# Patient Record
Sex: FEMALE | Race: BLACK OR AFRICAN AMERICAN | NOT HISPANIC OR LATINO | Employment: STUDENT | RURAL
[De-identification: names, ages, dates, MRNs, and addresses within clinical notes are randomized per-mention and may not be internally consistent; named-entity substitution may affect disease eponyms.]

---

## 2021-07-25 ENCOUNTER — HOSPITAL ENCOUNTER (EMERGENCY)
Facility: HOSPITAL | Age: 21
Discharge: HOME | End: 2021-07-25
Attending: EMERGENCY MEDICINE
Payer: MEDICAID

## 2021-07-25 VITALS
OXYGEN SATURATION: 98 % | DIASTOLIC BLOOD PRESSURE: 82 MMHG | SYSTOLIC BLOOD PRESSURE: 120 MMHG | RESPIRATION RATE: 18 BRPM | WEIGHT: 156 LBS | HEIGHT: 63 IN | HEART RATE: 98 BPM | TEMPERATURE: 99 F | BODY MASS INDEX: 27.64 KG/M2

## 2021-07-25 DIAGNOSIS — H66.001 NON-RECURRENT ACUTE SUPPURATIVE OTITIS MEDIA OF RIGHT EAR WITHOUT SPONTANEOUS RUPTURE OF TYMPANIC MEMBRANE: ICD-10-CM

## 2021-07-25 DIAGNOSIS — H60.391 OTHER INFECTIVE ACUTE OTITIS EXTERNA OF RIGHT EAR: Primary | ICD-10-CM

## 2021-07-25 PROCEDURE — 99283 EMERGENCY DEPT VISIT LOW MDM: CPT | Mod: ,,, | Performed by: EMERGENCY MEDICINE

## 2021-07-25 PROCEDURE — 99283 PR EMERGENCY DEPT VISIT,LEVEL III: ICD-10-PCS | Mod: ,,, | Performed by: EMERGENCY MEDICINE

## 2021-07-25 PROCEDURE — 96372 THER/PROPH/DIAG INJ SC/IM: CPT

## 2021-07-25 PROCEDURE — 99284 EMERGENCY DEPT VISIT MOD MDM: CPT

## 2021-07-25 PROCEDURE — 63600175 PHARM REV CODE 636 W HCPCS: Performed by: EMERGENCY MEDICINE

## 2021-07-25 RX ORDER — CEFTRIAXONE 1 G/1
1 INJECTION, POWDER, FOR SOLUTION INTRAMUSCULAR; INTRAVENOUS
Status: COMPLETED | OUTPATIENT
Start: 2021-07-25 | End: 2021-07-25

## 2021-07-25 RX ORDER — INSULIN GLARGINE 100 [IU]/ML
30 INJECTION, SOLUTION SUBCUTANEOUS NIGHTLY
COMMUNITY
Start: 2021-06-23

## 2021-07-25 RX ORDER — CLINDAMYCIN HYDROCHLORIDE 300 MG/1
300 CAPSULE ORAL 4 TIMES DAILY
Qty: 28 CAPSULE | Refills: 0 | Status: SHIPPED | OUTPATIENT
Start: 2021-07-25 | End: 2021-08-01

## 2021-07-25 RX ORDER — INSULIN ASPART 100 [IU]/ML
1-12 INJECTION, SOLUTION INTRAVENOUS; SUBCUTANEOUS
COMMUNITY
Start: 2021-06-23

## 2021-07-25 RX ADMIN — CEFTRIAXONE SODIUM 1 G: 1 INJECTION, POWDER, FOR SOLUTION INTRAMUSCULAR; INTRAVENOUS at 01:07

## 2021-07-26 ENCOUNTER — TELEPHONE (OUTPATIENT)
Dept: EMERGENCY MEDICINE | Facility: HOSPITAL | Age: 21
End: 2021-07-26

## 2022-09-08 ENCOUNTER — HOSPITAL ENCOUNTER (EMERGENCY)
Facility: HOSPITAL | Age: 22
Discharge: HOME | End: 2022-09-08
Attending: INTERNAL MEDICINE
Payer: MEDICAID

## 2022-09-08 VITALS
HEIGHT: 64 IN | WEIGHT: 148.63 LBS | SYSTOLIC BLOOD PRESSURE: 134 MMHG | RESPIRATION RATE: 19 BRPM | TEMPERATURE: 98 F | DIASTOLIC BLOOD PRESSURE: 82 MMHG | OXYGEN SATURATION: 99 % | HEART RATE: 113 BPM | BODY MASS INDEX: 25.38 KG/M2
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
## 2022-09-08 DIAGNOSIS — L02.91 ABSCESS: Primary | ICD-10-CM

## 2022-09-08 LAB — GLUCOSE SERPL-MCNC: 159 MG/DL (ref 70–105)

## 2022-09-08 PROCEDURE — 99284 EMERGENCY DEPT VISIT MOD MDM: CPT

## 2022-09-08 PROCEDURE — 63600175 PHARM REV CODE 636 W HCPCS: Performed by: INTERNAL MEDICINE

## 2022-09-08 PROCEDURE — 82962 GLUCOSE BLOOD TEST: CPT

## 2022-09-08 PROCEDURE — 99284 EMERGENCY DEPT VISIT MOD MDM: CPT | Mod: ,,, | Performed by: INTERNAL MEDICINE

## 2022-09-08 PROCEDURE — 96372 THER/PROPH/DIAG INJ SC/IM: CPT

## 2022-09-08 PROCEDURE — 99284 PR EMERGENCY DEPT VISIT,LEVEL IV: ICD-10-PCS | Mod: ,,, | Performed by: INTERNAL MEDICINE

## 2022-09-08 RX ORDER — CLINDAMYCIN HYDROCHLORIDE 150 MG/1
300 CAPSULE ORAL EVERY 8 HOURS
Qty: 42 CAPSULE | Refills: 0 | Status: SHIPPED | OUTPATIENT
Start: 2022-09-08 | End: 2022-09-15

## 2022-09-08 RX ORDER — KETOROLAC TROMETHAMINE 30 MG/ML
30 INJECTION, SOLUTION INTRAMUSCULAR; INTRAVENOUS
Status: COMPLETED | OUTPATIENT
Start: 2022-09-08 | End: 2022-09-08

## 2022-09-08 RX ORDER — CEFAZOLIN SODIUM 1 G/3ML
1 INJECTION, POWDER, FOR SOLUTION INTRAMUSCULAR; INTRAVENOUS
Status: COMPLETED | OUTPATIENT
Start: 2022-09-08 | End: 2022-09-08

## 2022-09-08 RX ADMIN — CEFAZOLIN SODIUM 1 G: 1 INJECTION, POWDER, FOR SOLUTION INTRAMUSCULAR; INTRAVENOUS at 09:09

## 2022-09-08 RX ADMIN — KETOROLAC TROMETHAMINE 30 MG: 30 INJECTION, SOLUTION INTRAMUSCULAR; INTRAVENOUS at 09:09

## 2022-09-09 NOTE — ED TRIAGE NOTES
Presented to ER with complaint of right sided facial swelling/pain. Large bump with redness noted.

## 2022-09-09 NOTE — ED PROVIDER NOTES
Encounter Date: 9/8/2022       History     Chief Complaint   Patient presents with    Insect Bite     PATIENT WITH AN INSECT BITE TO RIGHT CHEEK 2 DAYS AGO.  Started having and swelling 2 days ago the entire face on the right side but has gone down over today.  Now ages wound area that is tender and hard.  No fever chills nausea vomiting.  Patient is a diabetic.      Review of patient's allergies indicates:  No Known Allergies  Past Medical History:   Diagnosis Date    Diabetes mellitus      History reviewed. No pertinent surgical history.  History reviewed. No pertinent family history.  Social History     Tobacco Use    Smoking status: Never    Smokeless tobacco: Never   Substance Use Topics    Alcohol use: Never    Drug use: Never     Review of Systems   Constitutional:  Negative for fever.   HENT:  Negative for sore throat.    Respiratory:  Negative for shortness of breath.    Cardiovascular:  Negative for chest pain.   Gastrointestinal:  Negative for nausea.   Genitourinary:  Negative for dysuria.   Musculoskeletal:  Negative for back pain.   Skin:  Negative for rash.   Neurological:  Negative for weakness.   Hematological:  Does not bruise/bleed easily.     Physical Exam     Initial Vitals [09/08/22 2136]   BP Pulse Resp Temp SpO2   (!) 136/93 (!) 117 20 98 °F (36.7 °C) 98 %      MAP       --         Physical Exam    Vitals reviewed.  Constitutional: She appears well-developed.   HENT:   Head:       1 cm indurated area in the right mid cheek slight serous discharge.   Eyes: Pupils are equal, round, and reactive to light.   Neck:   Normal range of motion.  Cardiovascular:  Normal rate.           Pulmonary/Chest: Breath sounds normal.   Abdominal: Abdomen is soft.   Musculoskeletal:         General: Normal range of motion.      Cervical back: Normal range of motion.     Neurological: She is alert.   Skin: Skin is warm. Abscess noted.        Psychiatric: She has a normal mood and affect.       Medical Screening  Exam   See Full Note    ED Course   Procedures  Labs Reviewed   POCT GLUCOSE MONITORING CONTINUOUS - Abnormal; Notable for the following components:       Result Value    POC Glucose 159 (*)     All other components within normal limits          Imaging Results    None          Medications   ceFAZolin injection 1 g (has no administration in time range)   ketorolac injection 30 mg (has no administration in time range)     Medical Decision Making:   ED Management:  Since lesion abscesses gone down today, would not to [place] of her face which may result in significant scarring.  Will treat symptomatically for now           ED Course as of 09/08/22 2138   Thu Sep 08, 2022   2138 POC Glucose(!): 159 [PW]      ED Course User Index  [PW] Cecilio Ortega MD          Clinical Impression:   Final diagnoses:  [L02.91] Abscess (Primary)  [W57.XXXA] Bug bite with infection, initial encounter      ED Disposition Condition    Discharge Stable          ED Prescriptions       Medication Sig Dispense Start Date End Date Auth. Provider    clindamycin (CLEOCIN) 150 MG capsule Take 2 capsules (300 mg total) by mouth every 8 (eight) hours. for 7 days 42 capsule 9/8/2022 9/15/2022 Cecilio Ortega MD          Follow-up Information       Follow up With Specialties Details Why Contact Info    Letty Donohue MD Family Medicine In 3 days  [address]  [facility]   [place] [address] [phone]               Cecilio Ortega MD  09/08/22 2138       Cecilio Ortega MD  09/08/22 2138

## 2023-03-14 ENCOUNTER — HOSPITAL ENCOUNTER (EMERGENCY)
Facility: HOSPITAL | Age: 23
Discharge: HOME | End: 2023-03-15
Attending: INTERNAL MEDICINE
Payer: MEDICAID

## 2023-03-14 DIAGNOSIS — N39.0 URINARY TRACT INFECTION WITHOUT HEMATURIA, SITE UNSPECIFIED: Primary | ICD-10-CM

## 2023-03-14 DIAGNOSIS — N76.0 ACUTE VAGINITIS: ICD-10-CM

## 2023-03-14 DIAGNOSIS — E10.9 TYPE 1 DIABETES MELLITUS WITHOUT COMPLICATION: ICD-10-CM

## 2023-03-14 LAB
AMORPH PHOS CRY #/AREA URNS LPF: ABNORMAL /LPF
BACTERIA #/AREA URNS HPF: ABNORMAL /HPF
BILIRUB UR QL STRIP: NEGATIVE
CLARITY UR: CLEAR
COLOR UR: YELLOW
GLUCOSE SERPL-MCNC: 248 MG/DL (ref 70–105)
GLUCOSE UR STRIP-MCNC: 500 MG/DL
HCG UR QL IA.RAPID: NEGATIVE
KETONES UR STRIP-SCNC: 15 MG/DL
LEUKOCYTE ESTERASE UR QL STRIP: NEGATIVE
NITRITE UR QL STRIP: NEGATIVE
PH UR STRIP: 5 PH UNITS
PROT UR QL STRIP: ABNORMAL
RBC # UR STRIP: ABNORMAL /UL
RBC #/AREA URNS HPF: ABNORMAL /HPF
SP GR UR STRIP: 1.02
SQUAMOUS #/AREA URNS LPF: ABNORMAL /LPF
UROBILINOGEN UR STRIP-ACNC: 0.2 MG/DL
WBC #/AREA URNS HPF: ABNORMAL /HPF
WBC CLUMPS, UA: ABNORMAL /HPF

## 2023-03-14 PROCEDURE — 81025 URINE PREGNANCY TEST: CPT | Performed by: INTERNAL MEDICINE

## 2023-03-14 PROCEDURE — 99284 EMERGENCY DEPT VISIT MOD MDM: CPT | Mod: ,,, | Performed by: INTERNAL MEDICINE

## 2023-03-14 PROCEDURE — 87086 URINE CULTURE/COLONY COUNT: CPT | Performed by: INTERNAL MEDICINE

## 2023-03-14 PROCEDURE — 63600175 PHARM REV CODE 636 W HCPCS: Performed by: INTERNAL MEDICINE

## 2023-03-14 PROCEDURE — 99284 EMERGENCY DEPT VISIT MOD MDM: CPT

## 2023-03-14 PROCEDURE — 99284 PR EMERGENCY DEPT VISIT,LEVEL IV: ICD-10-PCS | Mod: ,,, | Performed by: INTERNAL MEDICINE

## 2023-03-14 PROCEDURE — 96372 THER/PROPH/DIAG INJ SC/IM: CPT | Performed by: INTERNAL MEDICINE

## 2023-03-14 PROCEDURE — 82962 GLUCOSE BLOOD TEST: CPT

## 2023-03-14 PROCEDURE — 81001 URINALYSIS AUTO W/SCOPE: CPT | Performed by: INTERNAL MEDICINE

## 2023-03-14 RX ORDER — NORGESTIMATE AND ETHINYL ESTRADIOL 7DAYSX3 28
1 KIT ORAL
COMMUNITY
Start: 2023-02-21

## 2023-03-14 RX ORDER — KETOROLAC TROMETHAMINE 30 MG/ML
30 INJECTION, SOLUTION INTRAMUSCULAR; INTRAVENOUS
Status: COMPLETED | OUTPATIENT
Start: 2023-03-14 | End: 2023-03-14

## 2023-03-14 RX ORDER — DOXYCYCLINE 100 MG/1
100 CAPSULE ORAL 2 TIMES DAILY
Qty: 20 CAPSULE | Refills: 0 | Status: SHIPPED | OUTPATIENT
Start: 2023-03-14 | End: 2023-03-24

## 2023-03-14 RX ORDER — CEFTRIAXONE 1 G/1
1 INJECTION, POWDER, FOR SOLUTION INTRAMUSCULAR; INTRAVENOUS
Status: COMPLETED | OUTPATIENT
Start: 2023-03-14 | End: 2023-03-14

## 2023-03-14 RX ADMIN — KETOROLAC TROMETHAMINE 30 MG: 30 INJECTION, SOLUTION INTRAMUSCULAR; INTRAVENOUS at 11:03

## 2023-03-14 RX ADMIN — CEFTRIAXONE SODIUM 1 G: 1 INJECTION, POWDER, FOR SOLUTION INTRAMUSCULAR; INTRAVENOUS at 11:03

## 2023-03-15 VITALS
WEIGHT: 147 LBS | SYSTOLIC BLOOD PRESSURE: 131 MMHG | HEIGHT: 64 IN | RESPIRATION RATE: 16 BRPM | BODY MASS INDEX: 25.1 KG/M2 | OXYGEN SATURATION: 99 % | HEART RATE: 98 BPM | DIASTOLIC BLOOD PRESSURE: 72 MMHG | TEMPERATURE: 98 F

## 2023-03-15 NOTE — ED PROVIDER NOTES
Encounter Date: 3/14/2023       History     Chief Complaint   Patient presents with    Dysuria    Vaginal Discharge     Patient comes in complaining of dysuria frequency and vaginal discharge for the last 1-2 days.  No abdominal pain, nausea vomiting fever chills.  The patient is sexually active on oral contraceptive pills.  Patient noticed some painful lesions in vaginal area as well.  No previous history of sexual transmitted disease.  Pap smear never according to patient.  Patient last sexually active on Friday.    Review of systems shows patient goes Endocrinology in [place] but has not been seen there since at least almost 1 year.  Blood sugars have been uncontrolled according to records even though she is a type 1 diabetic.  She also has a liver lesion that has been seen by GI that they think it is related to oral contraceptive pills.  She said ulcers and sores that have even had to have surgical drainage probably because of uncontrolled diabetes.      Review of patient's allergies indicates:  No Known Allergies  Past Medical History:   Diagnosis Date    Diabetes mellitus      History reviewed. No pertinent surgical history.  History reviewed. No pertinent family history.  Social History     Tobacco Use    Smoking status: Never    Smokeless tobacco: Never   Substance Use Topics    Alcohol use: Never    Drug use: Never     Review of Systems   Constitutional:  Negative for fever.   HENT:  Negative for sore throat.    Respiratory:  Negative for shortness of breath.    Cardiovascular:  Negative for chest pain.   Gastrointestinal:  Negative for nausea.   Genitourinary:  Negative for dysuria.   Musculoskeletal:  Negative for back pain.   Skin:  Negative for rash.   Neurological:  Negative for weakness.   Hematological:  Does not bruise/bleed easily.     Physical Exam     Initial Vitals [03/14/23 2250]   BP Pulse Resp Temp SpO2   135/74 (!) 112 16 98.4 °F (36.9 °C) 98 %      MAP       --         Physical  Exam    Vitals reviewed.  Constitutional: She appears well-developed.   Eyes: Pupils are equal, round, and reactive to light.   Neck:   Normal range of motion.  Cardiovascular:  Normal rate, regular rhythm, normal heart sounds and normal pulses.           Pulmonary/Chest: Effort normal and breath sounds normal.   Abdominal: Abdomen is soft.   Genitourinary:    Genitourinary Comments: Multiple early papular vesicular like lesions in interested vagina.  RN nurse present during exam.  Bimanual.         Musculoskeletal:         General: Normal range of motion.      Cervical back: Normal range of motion.     Neurological: She is alert. She has normal strength and normal reflexes. No cranial nerve deficit or sensory deficit. GCS eye subscore is 4. GCS verbal subscore is 5. GCS motor subscore is 6.   Skin: Skin is warm.   Psychiatric: She has a normal mood and affect.       Medical Screening Exam   See Full Note    ED Course   Procedures  Labs Reviewed   URINALYSIS, REFLEX TO URINE CULTURE - Abnormal; Notable for the following components:       Result Value    Protein, UA Trace (*)     Glucose,  (*)     Ketones, UA 15 (*)     Blood, UA Trace-Intact (*)     All other components within normal limits   URINALYSIS, MICROSCOPIC - Abnormal; Notable for the following components:    WBC, UA 5-10 (*)     RBC, UA 3-5 (*)     Bacteria, UA Moderate (*)     Squamous Epithelial Cells, UA Few (*)     WBC Clumps Few (*)     Amorphous Crystals, UA Moderate (*)     All other components within normal limits   POCT GLUCOSE MONITORING CONTINUOUS - Abnormal; Notable for the following components:    POC Glucose 248 (*)     All other components within normal limits   HCG QUALITATIVE URINE - Normal   CULTURE, URINE          Imaging Results    None          Medications   cefTRIAXone injection 1 g (1 g Intramuscular Given 3/14/23 2342)   ketorolac injection 30 mg (30 mg Intramuscular Given 3/14/23 2343)     Medical Decision Making:   History:    Old Medical Records: I decided to obtain old medical records.  Old Records Summarized: records from clinic visits and records from previous admission(s).       <> Summary of Records: Patient has poorly-controlled type 1 diabetic for years with liver lesion that maybe lay 2 0 consult the pills and has never had a Pap smear and is still on birth control pills and seeing GI.  Initial Assessment:   Patient with dysuria, frequency, and vaginal lesion discharge last 2 days.  Differential Diagnosis:   Urinary tract infection, vaginal infection,  Clinical Tests:   Lab Tests: Ordered and Reviewed  The following lab test(s) were unremarkable: Urinalysis       <> Summary of Lab: Urinalysis shows white blood cells consistent with urinary tract infection  Discussed with the patient that these could be herpetic lesions versus STD versus a vaginal infection.  Will set up with OBGYN for evaluation treatment as soon as possible, advised to stopped her birth control pills, stop having sexual intercourse until evaluated by a specialist.  Will give Rocephin and started antibiotics and follow back up with her primary care provider for blood sugar control.           ED Course as of 03/15/23 0657   Tue Mar 14, 2023   2338 POCT glucose(!) [PW]   2338 POC Glucose(!): 248 [PW]   2338 Urinalysis, Microscopic(!) [PW]   2338 WBC, UA(!): 5-10 [PW]   2353 Preg Test, Ur: Negative [PW]      ED Course User Index  [PW] Cecilio Ortega MD          Clinical Impression:   Final diagnoses:  [N39.0] Urinary tract infection without hematuria, site unspecified (Primary)  [N76.0] Acute vaginitis  [E10.9] Type 1 diabetes mellitus without complication        ED Disposition Condition    Discharge Stable          ED Prescriptions       Medication Sig Dispense Start Date End Date Auth. Provider    doxycycline (VIBRAMYCIN) 100 MG Cap Take 1 capsule (100 mg total) by mouth 2 (two) times daily. for 10 days 20 capsule 3/14/2023 3/24/2023 Cecilio Ortega MD           Follow-up Information       Follow up With Specialties Details Why Contact Info    Letty Donohue MD Family Medicine In 1 day  [address]  [facility]  [place] [id number] [phone]               Cecilio Ortega MD  03/14/23 0610       Cecilio Ortega MD  03/15/23 0692

## 2023-03-17 LAB — UA COMPLETE W REFLEX CULTURE PNL UR: NO GROWTH

## 2023-03-20 DIAGNOSIS — Z12.4 PAP SMEAR FOR CERVICAL CANCER SCREENING: Primary | ICD-10-CM

## 2023-03-20 DIAGNOSIS — A60.9 ANOGENITAL HERPESVIRAL INFECTION, UNSPECIFIED: ICD-10-CM

## 2023-07-22 ENCOUNTER — HOSPITAL ENCOUNTER (EMERGENCY)
Facility: HOSPITAL | Age: 23
Discharge: ANOTHER HOSPITAL | End: 2023-07-22
Attending: FAMILY MEDICINE
Payer: MEDICAID

## 2023-07-22 VITALS
SYSTOLIC BLOOD PRESSURE: 93 MMHG | DIASTOLIC BLOOD PRESSURE: 55 MMHG | TEMPERATURE: 98 F | WEIGHT: 150 LBS | OXYGEN SATURATION: 98 % | BODY MASS INDEX: 25.61 KG/M2 | HEIGHT: 64 IN | RESPIRATION RATE: 23 BRPM | HEART RATE: 112 BPM

## 2023-07-22 DIAGNOSIS — N30.01 ACUTE CYSTITIS WITH HEMATURIA: ICD-10-CM

## 2023-07-22 DIAGNOSIS — E10.10 DIABETIC KETOACIDOSIS WITHOUT COMA ASSOCIATED WITH TYPE 1 DIABETES MELLITUS: Primary | ICD-10-CM

## 2023-07-22 LAB
ACETONE SERPL QL SCN: NORMAL
ALBUMIN SERPL BCP-MCNC: 4.2 G/DL (ref 3.5–5)
ALBUMIN/GLOB SERPL: 0.8 {RATIO}
ALP SERPL-CCNC: 130 U/L (ref 52–144)
ALT SERPL W P-5'-P-CCNC: 34 U/L (ref 13–56)
ANION GAP SERPL CALCULATED.3IONS-SCNC: 24 MMOL/L (ref 7–16)
ANION GAP SERPL CALCULATED.3IONS-SCNC: 31 MMOL/L (ref 7–16)
ANION GAP SERPL CALCULATED.3IONS-SCNC: 36 MMOL/L (ref 7–16)
AST SERPL W P-5'-P-CCNC: 21 U/L (ref 15–37)
BACTERIA #/AREA URNS HPF: ABNORMAL /HPF
BASOPHILS # BLD AUTO: 0.07 K/UL (ref 0–0.2)
BASOPHILS NFR BLD AUTO: 0.4 % (ref 0–1)
BILIRUB SERPL-MCNC: 0.4 MG/DL (ref ?–1.2)
BILIRUB UR QL STRIP: NEGATIVE
BUN SERPL-MCNC: 15 MG/DL (ref 7–18)
BUN SERPL-MCNC: 16 MG/DL (ref 7–18)
BUN SERPL-MCNC: 22 MG/DL (ref 7–18)
BUN/CREAT SERPL: 13 (ref 6–20)
BUN/CREAT SERPL: 14 (ref 6–20)
BUN/CREAT SERPL: 16 (ref 6–20)
CALCIUM SERPL-MCNC: 8.1 MG/DL (ref 8.5–10.1)
CALCIUM SERPL-MCNC: 8.7 MG/DL (ref 8.5–10.1)
CALCIUM SERPL-MCNC: 8.8 MG/DL (ref 8.5–10.1)
CHLORIDE SERPL-SCNC: 102 MMOL/L (ref 98–107)
CHLORIDE SERPL-SCNC: 104 MMOL/L (ref 98–107)
CHLORIDE SERPL-SCNC: 93 MMOL/L (ref 98–107)
CLARITY UR: CLEAR
CO2 SERPL-SCNC: 12 MMOL/L (ref 21–32)
CO2 SERPL-SCNC: 5 MMOL/L (ref 21–32)
CO2 SERPL-SCNC: 6 MMOL/L (ref 21–32)
COLOR UR: YELLOW
CREAT SERPL-MCNC: 1.05 MG/DL (ref 0.55–1.02)
CREAT SERPL-MCNC: 1.2 MG/DL (ref 0.55–1.02)
CREAT SERPL-MCNC: 1.35 MG/DL (ref 0.55–1.02)
DIFFERENTIAL METHOD BLD: ABNORMAL
EGFR (NO RACE VARIABLE) (RUSH/TITUS): 57 ML/MIN/1.73M2
EGFR (NO RACE VARIABLE) (RUSH/TITUS): 66 ML/MIN/1.73M2
EGFR (NO RACE VARIABLE) (RUSH/TITUS): 77 ML/MIN/1.73M2
EOSINOPHIL # BLD AUTO: 0.02 K/UL (ref 0–0.5)
EOSINOPHIL NFR BLD AUTO: 0.1 % (ref 1–4)
ERYTHROCYTE [DISTWIDTH] IN BLOOD BY AUTOMATED COUNT: 12.6 % (ref 11.5–14.5)
GLOBULIN SER-MCNC: 5.5 G/DL (ref 2–4)
GLUCOSE SERPL-MCNC: 140 MG/DL (ref 70–105)
GLUCOSE SERPL-MCNC: 182 MG/DL (ref 74–106)
GLUCOSE SERPL-MCNC: 219 MG/DL (ref 74–106)
GLUCOSE SERPL-MCNC: 281 MG/DL (ref 70–105)
GLUCOSE SERPL-MCNC: 706 MG/DL (ref 74–106)
GLUCOSE UR STRIP-MCNC: 500 MG/DL
HCG UR QL IA.RAPID: NEGATIVE
HCO3 UR-SCNC: 5.7 MMOL/L (ref 21–28)
HCT VFR BLD AUTO: 41.8 % (ref 38–47)
HGB BLD-MCNC: 13.1 G/DL (ref 12–16)
IMM GRANULOCYTES # BLD AUTO: 0.39 K/UL (ref 0–0.04)
IMM GRANULOCYTES NFR BLD: 2.5 % (ref 0–0.4)
KETONES UR STRIP-SCNC: >=160 MG/DL
KETONES UR STRIP-SCNC: >=160 MG/DL
LEUKOCYTE ESTERASE UR QL STRIP: ABNORMAL
LYMPHOCYTES # BLD AUTO: 1.44 K/UL (ref 1–4.8)
LYMPHOCYTES NFR BLD AUTO: 9.1 % (ref 27–41)
LYMPHOCYTES NFR BLD MANUAL: 10 % (ref 27–41)
MAGNESIUM SERPL-MCNC: 1.8 MG/DL (ref 1.7–2.3)
MCH RBC QN AUTO: 29 PG (ref 27–31)
MCHC RBC AUTO-ENTMCNC: 31.3 G/DL (ref 32–36)
MCV RBC AUTO: 92.7 FL (ref 80–96)
MONOCYTES # BLD AUTO: 0.88 K/UL (ref 0–0.8)
MONOCYTES NFR BLD AUTO: 5.6 % (ref 2–6)
MONOCYTES NFR BLD MANUAL: 7 % (ref 2–6)
MPC BLD CALC-MCNC: 11.4 FL (ref 9.4–12.4)
NEUTROPHILS # BLD AUTO: 13.02 K/UL (ref 1.8–7.7)
NEUTROPHILS NFR BLD AUTO: 82.3 % (ref 53–65)
NEUTS SEG NFR BLD MANUAL: 83 % (ref 50–62)
NITRITE UR QL STRIP: NEGATIVE
NRBC BLD MANUAL-RTO: ABNORMAL %
PCO2 BLDA: 16 MMHG (ref 35–48)
PH SMN: 7.16 [PH] (ref 7.35–7.45)
PH UR STRIP: 5 PH UNITS
PHOSPHATE SERPL-MCNC: 2.7 MG/DL (ref 2.5–4.5)
PLATELET # BLD AUTO: 335 K/UL (ref 150–400)
PO2 BLDA: 84 MMHG (ref 83–108)
POC BASE EXCESS: -20.8 MMOL/L (ref -2–3)
POC SATURATED O2: 93 %
POTASSIUM SERPL-SCNC: 3.8 MMOL/L (ref 3.5–5.1)
POTASSIUM SERPL-SCNC: 3.8 MMOL/L (ref 3.5–5.1)
POTASSIUM SERPL-SCNC: 5.2 MMOL/L (ref 3.5–5.1)
PROT SERPL-MCNC: 9.7 G/DL (ref 6.4–8.2)
PROT UR QL STRIP: 100
RBC # BLD AUTO: 4.51 M/UL (ref 4.2–5.4)
RBC # UR STRIP: ABNORMAL /UL
RBC #/AREA URNS HPF: ABNORMAL /HPF
SODIUM SERPL-SCNC: 130 MMOL/L (ref 136–145)
SODIUM SERPL-SCNC: 134 MMOL/L (ref 136–145)
SODIUM SERPL-SCNC: 136 MMOL/L (ref 136–145)
SP GR UR STRIP: 1.02
UROBILINOGEN UR STRIP-ACNC: 0.2 MG/DL
WBC # BLD AUTO: 15.82 K/UL (ref 4.5–11)
WBC #/AREA URNS HPF: ABNORMAL /HPF

## 2023-07-22 PROCEDURE — 80053 COMPREHEN METABOLIC PANEL: CPT | Performed by: FAMILY MEDICINE

## 2023-07-22 PROCEDURE — 85025 COMPLETE CBC W/AUTO DIFF WBC: CPT | Performed by: FAMILY MEDICINE

## 2023-07-22 PROCEDURE — 25000003 PHARM REV CODE 250: Performed by: FAMILY MEDICINE

## 2023-07-22 PROCEDURE — 36600 WITHDRAWAL OF ARTERIAL BLOOD: CPT

## 2023-07-22 PROCEDURE — 81025 URINE PREGNANCY TEST: CPT | Performed by: FAMILY MEDICINE

## 2023-07-22 PROCEDURE — 99285 EMERGENCY DEPT VISIT HI MDM: CPT

## 2023-07-22 PROCEDURE — 63600175 PHARM REV CODE 636 W HCPCS: Performed by: FAMILY MEDICINE

## 2023-07-22 PROCEDURE — 99285 PR EMERGENCY DEPT VISIT,LEVEL V: ICD-10-PCS | Mod: ,,, | Performed by: FAMILY MEDICINE

## 2023-07-22 PROCEDURE — 82009 KETONE BODYS QUAL: CPT | Performed by: FAMILY MEDICINE

## 2023-07-22 PROCEDURE — 96361 HYDRATE IV INFUSION ADD-ON: CPT

## 2023-07-22 PROCEDURE — 84100 ASSAY OF PHOSPHORUS: CPT | Performed by: FAMILY MEDICINE

## 2023-07-22 PROCEDURE — 99285 EMERGENCY DEPT VISIT HI MDM: CPT | Mod: ,,, | Performed by: FAMILY MEDICINE

## 2023-07-22 PROCEDURE — 80048 BASIC METABOLIC PNL TOTAL CA: CPT | Mod: XB | Performed by: FAMILY MEDICINE

## 2023-07-22 PROCEDURE — 83735 ASSAY OF MAGNESIUM: CPT | Performed by: FAMILY MEDICINE

## 2023-07-22 PROCEDURE — 96365 THER/PROPH/DIAG IV INF INIT: CPT

## 2023-07-22 PROCEDURE — 82962 GLUCOSE BLOOD TEST: CPT

## 2023-07-22 PROCEDURE — 96376 TX/PRO/DX INJ SAME DRUG ADON: CPT

## 2023-07-22 PROCEDURE — 81001 URINALYSIS AUTO W/SCOPE: CPT | Performed by: FAMILY MEDICINE

## 2023-07-22 PROCEDURE — 82803 BLOOD GASES ANY COMBINATION: CPT

## 2023-07-22 PROCEDURE — 96375 TX/PRO/DX INJ NEW DRUG ADDON: CPT

## 2023-07-22 PROCEDURE — 81002 URINALYSIS NONAUTO W/O SCOPE: CPT | Mod: 59 | Performed by: FAMILY MEDICINE

## 2023-07-22 PROCEDURE — 94761 N-INVAS EAR/PLS OXIMETRY MLT: CPT | Mod: XB

## 2023-07-22 RX ORDER — DEXTROSE MONOHYDRATE AND SODIUM CHLORIDE 5; .45 G/100ML; G/100ML
INJECTION, SOLUTION INTRAVENOUS CONTINUOUS PRN
Status: DISCONTINUED | OUTPATIENT
Start: 2023-07-22 | End: 2023-07-22 | Stop reason: HOSPADM

## 2023-07-22 RX ORDER — SODIUM CHLORIDE 9 MG/ML
1000 INJECTION, SOLUTION INTRAVENOUS
Status: DISCONTINUED | OUTPATIENT
Start: 2023-07-22 | End: 2023-07-22 | Stop reason: HOSPADM

## 2023-07-22 RX ORDER — SODIUM CHLORIDE 9 MG/ML
1000 INJECTION, SOLUTION INTRAVENOUS
Status: COMPLETED | OUTPATIENT
Start: 2023-07-22 | End: 2023-07-22

## 2023-07-22 RX ORDER — SODIUM CHLORIDE 9 MG/ML
1000 INJECTION, SOLUTION INTRAVENOUS CONTINUOUS
Status: DISCONTINUED | OUTPATIENT
Start: 2023-07-22 | End: 2023-07-22 | Stop reason: HOSPADM

## 2023-07-22 RX ORDER — SODIUM CHLORIDE 0.9 % (FLUSH) 0.9 %
10 SYRINGE (ML) INJECTION
Status: DISCONTINUED | OUTPATIENT
Start: 2023-07-22 | End: 2023-07-22 | Stop reason: HOSPADM

## 2023-07-22 RX ADMIN — SODIUM CHLORIDE 1000 ML: 9 INJECTION, SOLUTION INTRAVENOUS at 03:07

## 2023-07-22 RX ADMIN — HUMAN INSULIN 10 UNITS: 100 INJECTION, SOLUTION SUBCUTANEOUS at 02:07

## 2023-07-22 RX ADMIN — CEFTRIAXONE SODIUM 1 G: 1 INJECTION, POWDER, FOR SOLUTION INTRAMUSCULAR; INTRAVENOUS at 05:07

## 2023-07-22 RX ADMIN — SODIUM CHLORIDE 1000 ML: 9 INJECTION, SOLUTION INTRAVENOUS at 02:07

## 2023-07-22 NOTE — ED TRIAGE NOTES
Pt brought in by [facility]. Report that pt did not take insulin prior to going to work yesterday. FM called EMS due to pt being confused and getting a high reading on glucometer. 1 liter of NS given. Pt states she took 7 units of Novolog and her lantus prior to being brought in.    29-Sep-2019 23:12

## 2023-07-22 NOTE — ED NOTES
REPORT CALLED TO [facility] ICU. GIVEN TO DAMON MALLOY RN. AWAITING TRANSPORT PER JOELLEN POWELL.MOTHER AND PT AWARE.

## 2023-07-22 NOTE — ED PROVIDER NOTES
Encounter Date: 7/22/2023       History     Chief Complaint   Patient presents with    Hyperglycemia     23 year old female presents to ER via EMS for hyperglycemia. H/o type 1 DM. Had not taken her insulin all day. Prone to poorly controlled DM. No fever, chills, body aches, vomiting, diarrhea, dysuria.    The history is provided by the patient and the EMS personnel. No  was used.   Review of patient's allergies indicates:  No Known Allergies  Past Medical History:   Diagnosis Date    Diabetes mellitus      History reviewed. No pertinent surgical history.  History reviewed. No pertinent family history.  Social History     Tobacco Use    Smoking status: Never    Smokeless tobacco: Never   Substance Use Topics    Alcohol use: Yes     Comment: ocassionally    Drug use: Never     Review of Systems   Constitutional:  Positive for activity change, appetite change and fatigue. Negative for chills, fever and unexpected weight change.   HENT: Negative.     Eyes: Negative.    Respiratory: Negative.     Cardiovascular: Negative.    Gastrointestinal: Negative.    Endocrine: Positive for polydipsia. Negative for polyuria.   Genitourinary: Negative.    Musculoskeletal: Negative.    Skin: Negative.    Allergic/Immunologic: Negative.    Neurological: Negative.    Hematological: Negative.    Psychiatric/Behavioral: Negative.       Physical Exam     Initial Vitals [07/22/23 0146]   BP Pulse Resp Temp SpO2   (!) 145/90 (!) 136 (!) 29 98.4 °F (36.9 °C) 99 %      MAP       --         Physical Exam    Nursing note and vitals reviewed.  Constitutional: She appears well-developed and well-nourished.   HENT:   Head: Normocephalic and atraumatic.   Right Ear: External ear normal.   Left Ear: External ear normal.   Nose: Nose normal.   Mouth/Throat: Oropharynx is clear and moist.   Eyes: Conjunctivae and EOM are normal. Pupils are equal, round, and reactive to light.   Neck: Neck supple.   Normal range of  motion.  Cardiovascular:  Regular rhythm, normal heart sounds and intact distal pulses.           tachycardia   Pulmonary/Chest: Breath sounds normal.   Abdominal: Abdomen is soft. Bowel sounds are normal.   Musculoskeletal:         General: Normal range of motion.      Cervical back: Normal range of motion and neck supple.     Neurological: She is alert and oriented to person, place, and time. She has normal strength and normal reflexes. GCS score is 15. GCS eye subscore is 4. GCS verbal subscore is 5. GCS motor subscore is 6.   Skin: Skin is warm and dry. Capillary refill takes less than 2 seconds.   Psychiatric: She has a normal mood and affect. Her behavior is normal. Judgment and thought content normal.       Medical Screening Exam   See Full Note    ED Course   Procedures  Labs Reviewed   COMPREHENSIVE METABOLIC PANEL - Abnormal; Notable for the following components:       Result Value    Sodium 130 (*)     Potassium 5.2 (*)     Chloride 93 (*)     CO2 6 (*)     Anion Gap 36 (*)     Glucose 706 (*)     BUN 22 (*)     Creatinine 1.35 (*)     Total Protein 9.7 (*)     Globulin 5.5 (*)     eGFR 57 (*)     All other components within normal limits   URINALYSIS, REFLEX TO URINE CULTURE - Abnormal; Notable for the following components:    Leukocytes, UA Trace (*)     Protein,  (*)     Glucose,  (*)     Ketones, UA >=160 (*)     Blood, UA Small (*)     All other components within normal limits   KETONES URINE - Abnormal; Notable for the following components:    Ketones, UA >=160 (*)     All other components within normal limits   CBC WITH DIFFERENTIAL - Abnormal; Notable for the following components:    WBC 15.82 (*)     MCHC 31.3 (*)     Neutrophils % 82.3 (*)     Lymphocytes % 9.1 (*)     Neutrophils, Abs 13.02 (*)     Eosinophils % 0.1 (*)     Immature Granulocytes % 2.5 (*)     Monocytes, Absolute 0.88 (*)     Immature Granulocytes, Absolute 0.39 (*)     All other components within normal limits    URINALYSIS, MICROSCOPIC - Abnormal; Notable for the following components:    RBC, UA 15-25 (*)     All other components within normal limits   MANUAL DIFFERENTIAL - Abnormal; Notable for the following components:    Segmented Neutrophils, Man % 83 (*)     Lymphocytes, Man % 10 (*)     Monocytes, Man % 7 (*)     All other components within normal limits   BASIC METABOLIC PANEL - Abnormal; Notable for the following components:    Sodium 134 (*)     CO2 5 (*)     Anion Gap 31 (*)     Glucose 219 (*)     Creatinine 1.20 (*)     All other components within normal limits   BASIC METABOLIC PANEL - Abnormal; Notable for the following components:    CO2 12 (*)     Anion Gap 24 (*)     Glucose 182 (*)     Creatinine 1.05 (*)     Calcium 8.1 (*)     All other components within normal limits   POCT GLUCOSE MONITORING CONTINUOUS - Abnormal; Notable for the following components:    POC Glucose 281 (*)     All other components within normal limits   POCT GLUCOSE MONITORING CONTINUOUS - Abnormal; Notable for the following components:    POC Glucose 140 (*)     All other components within normal limits   HCG QUALITATIVE URINE - Normal   PHOSPHORUS - Normal   MAGNESIUM - Normal   CBC W/ AUTO DIFFERENTIAL    Narrative:     The following orders were created for panel order CBC auto differential.  Procedure                               Abnormality         Status                     ---------                               -----------         ------                     CBC with Differential[[id number]]        Abnormal            Final result               Manual Differential[[id number]]          Abnormal            Final result                 Please view results for these tests on the individual orders.   ACETONE   BASIC METABOLIC PANEL   PHOSPHORUS   POCT GLUCOSE MONITORING CONTINUOUS          Imaging Results    None          Medications   insulin regular injection 12 Units 0.12 mL (12 Units Intravenous Not Given 7/22/23 9124)   sodium  chloride 0.9% flush 10 mL (has no administration in time range)   0.9%  NaCl infusion (has no administration in time range)   dextrose 5 % and 0.45 % NaCl infusion (has no administration in time range)   dextrose 50% injection 25 g (has no administration in time range)   dextrose 50% injection 12.5 g (has no administration in time range)   0.9%  NaCl infusion (0 mLs Intravenous Stopped 7/22/23 0310)   insulin regular injection 10 Units 0.1 mL (10 Units Intravenous Given 7/22/23 0204)   insulin regular injection 10 Units 0.1 mL (10 Units Intravenous Given 7/22/23 0254)   0.9%  NaCl infusion (1,000 mLs Intravenous New Bag 7/22/23 0312)   cefTRIAXone (ROCEPHIN) 1 g in dextrose 5 % in water (D5W) 5 % 100 mL IVPB (MB+) (0 g Intravenous Stopped 7/22/23 0618)     Medical Decision Making:   Initial Assessment:   23 Y O FEMALE WITH TYPE 1 DM WENT TO WORK YESTERDAY AND FORGOT HER INSULIN AT HOME. ON LANTUS AND REG INSULIN SLIDING SCALE.  Differential Diagnosis:   DKA, DM with HYPERGLYCEMIA  Clinical Tests:   Lab Tests: Ordered and Reviewed       <> Summary of Lab: Marked abnormalities on CMP including Labs Reviewed  COMPREHENSIVE METABOLIC PANEL - Abnormal; Notable for the following components:      Result Value   Sodium 130 (*)    Potassium 5.2 (*)    Chloride 93 (*)    CO2 6 (*)    Anion Gap 36 (*)    Glucose 706 (*)    BUN 22 (*)    Creatinine 1.35 (*)    Total Protein 9.7 (*)    Globulin 5.5 (*)    eGFR 57 (*)    All other components within normal limits  URINALYSIS, REFLEX TO URINE CULTURE - Abnormal; Notable for the following components:   Leukocytes, UA Trace (*)    Protein,  (*)    Glucose,  (*)    Ketones, UA >=160 (*)    Blood, UA Small (*)    All other components within normal limits  KETONES URINE - Abnormal; Notable for the following components:   Ketones, UA >=160 (*)    All other components within normal limits  CBC WITH DIFFERENTIAL - Abnormal; Notable for the following components:   WBC 15.82  (*)    MCHC 31.3 (*)    Neutrophils % 82.3 (*)    Lymphocytes % 9.1 (*)    Neutrophils, Abs 13.02 (*)    Eosinophils % 0.1 (*)    Immature Granulocytes % 2.5 (*)    Monocytes, Absolute 0.88 (*)    Immature Granulocytes, Absolute 0.39 (*)    All other components within normal limits  URINALYSIS, MICROSCOPIC - Abnormal; Notable for the following components:   RBC, UA 15-25 (*)    All other components within normal limits  MANUAL DIFFERENTIAL - Abnormal; Notable for the following components:   Segmented Neutrophils, Man % 83 (*)    Lymphocytes, Man % 10 (*)    Monocytes, Man % 7 (*)    All other components within normal limits  BASIC METABOLIC PANEL - Abnormal; Notable for the following components:   Sodium 134 (*)    CO2 5 (*)    Anion Gap 31 (*)    Glucose 219 (*)    Creatinine 1.20 (*)        ED Management:  Administered IVF- NS 3 Liters, Reg insulin 30 units total, Rocephin 1 gm IV. Insulin drip.  Other:   I have discussed this case with another health care provider.       <> Summary of the Discussion: Will transfer to another hospital with ICU                       Clinical Impression:   Final diagnoses:  [E10.10] Diabetic ketoacidosis without coma associated with type 1 diabetes mellitus (Primary)  [N30.01] Acute cystitis with hematuria        ED Disposition Condition    Transfer to Another Facility Cynthia Bentley MD  07/22/23 3081       Ashwini Bentley MD  07/22/23 6098

## 2023-07-27 LAB
GLUCOSE SERPL-MCNC: 526 MG/DL (ref 70–105)
GLUCOSE SERPL-MCNC: >600 MG/DL (ref 70–105)

## 2023-07-28 NOTE — ADDENDUM NOTE
Encounter addended by: Jennifer Nath on: 7/28/2023 10:52 AM   Actions taken: SmartForm saved, Flowsheet accepted

## 2024-06-03 ENCOUNTER — OFFICE VISIT (OUTPATIENT)
Dept: FAMILY MEDICINE | Facility: CLINIC | Age: 24
End: 2024-06-03
Payer: COMMERCIAL

## 2024-06-03 VITALS
HEART RATE: 99 BPM | TEMPERATURE: 98 F | SYSTOLIC BLOOD PRESSURE: 126 MMHG | RESPIRATION RATE: 19 BRPM | DIASTOLIC BLOOD PRESSURE: 92 MMHG | OXYGEN SATURATION: 100 %

## 2024-06-03 DIAGNOSIS — Z11.1 PPD SCREENING TEST: Primary | ICD-10-CM

## 2024-06-03 PROCEDURE — 1159F MED LIST DOCD IN RCRD: CPT | Mod: ,,, | Performed by: FAMILY MEDICINE

## 2024-06-03 PROCEDURE — 3074F SYST BP LT 130 MM HG: CPT | Mod: ,,, | Performed by: FAMILY MEDICINE

## 2024-06-03 PROCEDURE — 3080F DIAST BP >= 90 MM HG: CPT | Mod: ,,, | Performed by: FAMILY MEDICINE

## 2024-06-03 PROCEDURE — 86580 TB INTRADERMAL TEST: CPT | Mod: ,,, | Performed by: FAMILY MEDICINE

## 2024-06-03 PROCEDURE — 1160F RVW MEDS BY RX/DR IN RCRD: CPT | Mod: ,,, | Performed by: FAMILY MEDICINE

## 2024-06-03 PROCEDURE — 99203 OFFICE O/P NEW LOW 30 MIN: CPT | Mod: ,,, | Performed by: FAMILY MEDICINE

## 2024-06-03 NOTE — PROGRESS NOTES
Subjective:       Patient ID: Jemal Hernandez is a 23 y.o. female.    Chief Complaint: PPD Placement (Needs TB skin test)    Pt needs tb skin test for work, no hx of tb.      Review of Systems   Constitutional:  Negative for activity change, appetite change, chills, diaphoresis, fatigue, fever and unexpected weight change.   HENT:  Negative for nasal congestion, dental problem, drooling, ear discharge, ear pain, facial swelling, hearing loss, mouth sores, nosebleeds, postnasal drip, rhinorrhea, sinus pressure/congestion, sneezing, sore throat, tinnitus, trouble swallowing, voice change and goiter.    Eyes:  Negative for photophobia, discharge, itching and visual disturbance.   Respiratory:  Negative for apnea, cough, choking, chest tightness, shortness of breath, wheezing and stridor.    Cardiovascular:  Negative for chest pain, palpitations, leg swelling and claudication.   Gastrointestinal:  Negative for abdominal distention, abdominal pain, anal bleeding, blood in stool, change in bowel habit, constipation, diarrhea, nausea and vomiting.   Endocrine: Negative for cold intolerance, heat intolerance, polydipsia, polyphagia and polyuria.   Genitourinary:  Negative for bladder incontinence, decreased urine volume, difficulty urinating, dysuria, enuresis, flank pain, frequency, hematuria, nocturia, pelvic pain and urgency.   Musculoskeletal:  Negative for arthralgias, back pain, gait problem, joint swelling, leg pain, myalgias, neck pain, neck stiffness and joint deformity.   Integumentary:  Negative for pallor, rash, wound, breast mass and breast tenderness.   Allergic/Immunologic: Negative for environmental allergies, food allergies and immunocompromised state.   Neurological:  Negative for dizziness, vertigo, tremors, seizures, syncope, facial asymmetry, speech difficulty, weakness, light-headedness, numbness, headaches, memory loss and coordination difficulties.   Hematological:  Negative for adenopathy. Does not  bruise/bleed easily.   Psychiatric/Behavioral:  Negative for agitation, behavioral problems, confusion, decreased concentration, dysphoric mood, hallucinations, self-injury, sleep disturbance and suicidal ideas. The patient is not nervous/anxious and is not hyperactive.    Breast: Negative for mass and tenderness        Objective:      Physical Exam  Vitals reviewed.   Constitutional:       Appearance: Normal appearance.   HENT:      Head: Normocephalic and atraumatic.      Right Ear: Tympanic membrane, ear canal and external ear normal.      Left Ear: Tympanic membrane, ear canal and external ear normal.      Nose: Nose normal.      Mouth/Throat:      Mouth: Mucous membranes are moist.      Pharynx: Oropharynx is clear.   Eyes:      Extraocular Movements: Extraocular movements intact.      Conjunctiva/sclera: Conjunctivae normal.      Pupils: Pupils are equal, round, and reactive to light.   Cardiovascular:      Rate and Rhythm: Normal rate and regular rhythm.      Pulses: Normal pulses.      Heart sounds: Normal heart sounds.   Pulmonary:      Effort: Pulmonary effort is normal.      Breath sounds: Normal breath sounds.   Abdominal:      General: Bowel sounds are normal.      Palpations: Abdomen is soft.   Musculoskeletal:         General: Normal range of motion.      Cervical back: Normal range of motion and neck supple.   Skin:     General: Skin is warm and dry.   Neurological:      General: No focal deficit present.      Mental Status: She is alert. Mental status is at baseline.   Psychiatric:         Mood and Affect: Mood normal.         Behavior: Behavior normal.         Thought Content: Thought content normal.         Judgment: Judgment normal.         Assessment:       1. PPD screening test        Plan:     PPD screening test         Will check ppd test. Patient will f/u in 2-3 days.

## 2024-06-06 ENCOUNTER — CLINICAL SUPPORT (OUTPATIENT)
Dept: FAMILY MEDICINE | Facility: CLINIC | Age: 24
End: 2024-06-06
Payer: COMMERCIAL

## 2024-06-06 DIAGNOSIS — Z11.1 PPD SCREENING TEST: Primary | ICD-10-CM

## 2024-06-06 LAB
TB INDURATION - 48 HR READ: NORMAL
TB INDURATION - 72 HR READ: 0 MM
TB SKIN TEST - 48 HR READ: NORMAL
TB SKIN TEST - 72 HR READ: NEGATIVE

## 2025-03-25 ENCOUNTER — HOSPITAL ENCOUNTER (EMERGENCY)
Facility: HOSPITAL | Age: 25
Discharge: HOME | End: 2025-03-25
Attending: INTERNAL MEDICINE
Payer: COMMERCIAL

## 2025-03-25 VITALS
RESPIRATION RATE: 20 BRPM | OXYGEN SATURATION: 99 % | SYSTOLIC BLOOD PRESSURE: 153 MMHG | HEIGHT: 67 IN | BODY MASS INDEX: 25.64 KG/M2 | TEMPERATURE: 98 F | DIASTOLIC BLOOD PRESSURE: 96 MMHG | WEIGHT: 163.38 LBS | HEART RATE: 107 BPM

## 2025-03-25 DIAGNOSIS — S30.1XXA CONTUSION OF ABDOMINAL WALL, INITIAL ENCOUNTER: Primary | ICD-10-CM
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
## 2025-03-25 DIAGNOSIS — Z3A.15 15 WEEKS GESTATION OF PREGNANCY: ICD-10-CM

## 2025-03-25 PROCEDURE — 99282 EMERGENCY DEPT VISIT SF MDM: CPT | Mod: ,,, | Performed by: INTERNAL MEDICINE

## 2025-03-25 PROCEDURE — 99284 EMERGENCY DEPT VISIT MOD MDM: CPT

## 2025-03-26 NOTE — ED TRIAGE NOTES
PRESENTS WITH C/O FALL AFTER TRIPPING ON RUG IN HOME. STATES SHE LANDED ON THE LT LOWER ABD. APPROX 15 WK PREGNANT. DENIES ABD PAIN. TEARFUL AND APPEARS ANXIOUS.

## 2025-03-26 NOTE — ED PROVIDER NOTES
Encounter Date: 3/25/2025       History     Chief Complaint   Patient presents with    Fall     PATIENT COMES IN 15 weeks pregnant, slipped and fell at home onto her abdomen.  Denies any abdominal pain, nausea vomiting, pelvic pain, vaginal bleeding or discharge.  Was seen by OBGYDr. Amy SHANKS today in the clinic.  Previous had ultrasound and she is high risk because insulin pens diabetes.  Endocrinologist in [place].  She has been referred to the high risk clinic at [facility] in [facility].        Review of patient's allergies indicates:  No Known Allergies  Past Medical History:   Diagnosis Date    Diabetes mellitus      History reviewed. No pertinent surgical history.  No family history on file.  Social History[1]  Review of Systems   Constitutional:  Negative for fever.   HENT:  Negative for sore throat.    Respiratory:  Negative for shortness of breath.    Cardiovascular:  Negative for chest pain.   Gastrointestinal:  Negative for nausea.   Genitourinary:  Negative for dysuria.   Musculoskeletal:  Negative for back pain.   Skin:  Negative for rash.   Neurological:  Negative for weakness.   Hematological:  Does not bruise/bleed easily.       Physical Exam     Initial Vitals [03/25/25 1902]   BP Pulse Resp Temp SpO2   (!) 153/96 107 20 98.2 °F (36.8 °C) 99 %      MAP       --         Physical Exam    Vitals reviewed.  Constitutional: She appears well-developed.   Eyes: Pupils are equal, round, and reactive to light.   Neck:   Normal range of motion.  Cardiovascular:  Normal rate, regular rhythm, normal heart sounds and normal pulses.           Pulmonary/Chest: Effort normal and breath sounds normal.   Abdominal: Abdomen is soft and protuberant. Bowel sounds are normal. There is no abdominal tenderness. There is no rebound and no guarding.   Genitourinary:    Vagina and rectum normal.   Uterus is enlarged. Cervix exhibits no motion tenderness. Right adnexum displays no tenderness. Left adnexum displays no  tenderness.    No vaginal discharge, tenderness or bleeding.   No tenderness or bleeding in the vagina.    Genitourinary Comments: RN present on exam for bimanual.     Musculoskeletal:         General: Normal range of motion.      Cervical back: Normal range of motion.     Neurological: She is alert. She has normal strength. No cranial nerve deficit. GCS eye subscore is 4. GCS verbal subscore is 5. GCS motor subscore is 6.   Skin: Skin is warm.   Psychiatric: She has a normal mood and affect.         Medical Screening Exam   See Full Note    ED Course   Procedures  Labs Reviewed - No data to display       Imaging Results    None          Medications - No data to display  Medical Decision Making  Patient is 15 weeks pregnant fell onto her abdomen today at home, no nausea vomiting, abdominal pain vaginal bleeding or discharge.    Amount and/or Complexity of Data Reviewed  Discussion of management or test interpretation with external provider(s): Patient fell with on the abdomen fetal heart tones 150, abdomen not tender, no bruises, pelvic exam shows no evidence abnormalities or tenderness.  Advised follow back up with Dr. Reyes come OBGYN tomorrow for further workup evaluation                                      Clinical Impression:   Final diagnoses:  [S30.1XXA] Contusion of abdominal wall, initial encounter (Primary)  [Z3A.15] 15 weeks gestation of pregnancy  [W19.XXXA] Fall, initial encounter        ED Disposition Condition    Discharge Stable          ED Prescriptions    None       Follow-up Information       Follow up With Specialties Details Why Contact Info    Paige Reyes MD Obstetrics In 1 day  [address] [facility] [address]  [phone]                   [1]   Social History  Tobacco Use    Smoking status: Never    Smokeless tobacco: Never   Substance Use Topics    Alcohol use: Not Currently    Drug use: Never        Cecilio Ortega MD  03/25/25 8561

## 2025-07-10 ENCOUNTER — HOSPITAL ENCOUNTER (EMERGENCY)
Facility: HOSPITAL | Age: 25
Discharge: ANOTHER HOSPITAL | End: 2025-07-10
Attending: INTERNAL MEDICINE
Payer: COMMERCIAL

## 2025-07-10 VITALS
BODY MASS INDEX: 28.82 KG/M2 | OXYGEN SATURATION: 99 % | HEART RATE: 124 BPM | HEIGHT: 63 IN | WEIGHT: 162.63 LBS | DIASTOLIC BLOOD PRESSURE: 91 MMHG | RESPIRATION RATE: 18 BRPM | SYSTOLIC BLOOD PRESSURE: 130 MMHG | TEMPERATURE: 98 F

## 2025-07-10 DIAGNOSIS — O47.9 BRAXTON HICK'S CONTRACTION: Primary | ICD-10-CM

## 2025-07-10 DIAGNOSIS — R11.2 NAUSEA AND VOMITING, UNSPECIFIED VOMITING TYPE: ICD-10-CM

## 2025-07-10 DIAGNOSIS — E10.69 TYPE 1 DIABETES MELLITUS WITH OTHER SPECIFIED COMPLICATION: ICD-10-CM

## 2025-07-10 DIAGNOSIS — Z3A.29 29 WEEKS GESTATION OF PREGNANCY: ICD-10-CM

## 2025-07-10 LAB
ACETONE SERPL QL SCN: NORMAL
ALBUMIN SERPL BCP-MCNC: 2.6 G/DL (ref 3.5–5)
ALBUMIN/GLOB SERPL: 0.5 {RATIO}
ALP SERPL-CCNC: 147 U/L (ref 40–150)
ALT SERPL W P-5'-P-CCNC: 8 U/L
ANION GAP SERPL CALCULATED.3IONS-SCNC: 24 MMOL/L (ref 7–16)
AST SERPL W P-5'-P-CCNC: 12 U/L (ref 11–45)
BACTERIA #/AREA URNS HPF: ABNORMAL /HPF
BASOPHILS # BLD AUTO: 0.08 K/UL (ref 0–0.2)
BASOPHILS NFR BLD AUTO: 0.5 % (ref 0–1)
BILIRUB SERPL-MCNC: 0.4 MG/DL
BILIRUB UR QL STRIP: ABNORMAL
BUN SERPL-MCNC: 9 MG/DL (ref 7–19)
BUN/CREAT SERPL: 9 (ref 6–20)
CALCIUM SERPL-MCNC: 9.5 MG/DL (ref 8.4–10.2)
CHLORIDE SERPL-SCNC: 109 MMOL/L (ref 98–107)
CLARITY UR: ABNORMAL
CO2 SERPL-SCNC: 9 MMOL/L (ref 22–29)
COARSE GRAN CASTS #/AREA URNS LPF: ABNORMAL /LPF
COLOR UR: YELLOW
CREAT SERPL-MCNC: 0.97 MG/DL (ref 0.55–1.02)
DIFFERENTIAL METHOD BLD: ABNORMAL
EGFR (NO RACE VARIABLE) (RUSH/TITUS): 84 ML/MIN/1.73M2
EOSINOPHIL # BLD AUTO: 0.08 K/UL (ref 0–0.5)
EOSINOPHIL NFR BLD AUTO: 0.5 % (ref 1–4)
EOSINOPHIL NFR BLD MANUAL: 2 % (ref 1–4)
ERYTHROCYTE [DISTWIDTH] IN BLOOD BY AUTOMATED COUNT: 12.5 % (ref 11.5–14.5)
FINE GRAN CASTS #/AREA URNS LPF: ABNORMAL /LPF
GLOBULIN SER-MCNC: 5.3 G/DL (ref 2–4)
GLUCOSE SERPL-MCNC: 116 MG/DL (ref 70–105)
GLUCOSE SERPL-MCNC: 99 MG/DL (ref 74–100)
GLUCOSE UR STRIP-MCNC: NEGATIVE MG/DL
HCT VFR BLD AUTO: 33.2 % (ref 38–47)
HGB BLD-MCNC: 11.1 G/DL (ref 12–16)
IMM GRANULOCYTES # BLD AUTO: 0.49 K/UL (ref 0–0.04)
IMM GRANULOCYTES NFR BLD: 3.2 % (ref 0–0.4)
KETONES UR STRIP-SCNC: >=160 MG/DL
LEUKOCYTE ESTERASE UR QL STRIP: ABNORMAL
LYMPHOCYTES # BLD AUTO: 1.45 K/UL (ref 1–4.8)
LYMPHOCYTES NFR BLD AUTO: 9.4 % (ref 27–41)
LYMPHOCYTES NFR BLD MANUAL: 17 % (ref 27–41)
MCH RBC QN AUTO: 27.7 PG (ref 27–31)
MCHC RBC AUTO-ENTMCNC: 33.4 G/DL (ref 32–36)
MCV RBC AUTO: 82.8 FL (ref 80–96)
MONOCYTES # BLD AUTO: 1.18 K/UL (ref 0–0.8)
MONOCYTES NFR BLD AUTO: 7.7 % (ref 2–6)
MONOCYTES NFR BLD MANUAL: 5 % (ref 2–6)
MPC BLD CALC-MCNC: 11.5 FL (ref 9.4–12.4)
MUCOUS THREADS #/AREA URNS HPF: ABNORMAL /HPF
NEUTROPHILS # BLD AUTO: 12.08 K/UL (ref 1.8–7.7)
NEUTROPHILS NFR BLD AUTO: 78.7 % (ref 53–65)
NEUTS BAND NFR BLD MANUAL: 1 % (ref 1–5)
NEUTS SEG NFR BLD MANUAL: 75 % (ref 50–62)
NITRITE UR QL STRIP: NEGATIVE
NRBC # BLD AUTO: 0 X10E3/UL
NRBC, AUTO (.00): 0 %
PH UR STRIP: 5.5 PH UNITS
PLATELET # BLD AUTO: 277 K/UL (ref 150–400)
PLATELET MORPHOLOGY: NORMAL
POTASSIUM SERPL-SCNC: 3.9 MMOL/L (ref 3.5–5.1)
PROT SERPL-MCNC: 7.9 G/DL (ref 6.4–8.3)
PROT UR QL STRIP: 30
RBC # BLD AUTO: 4.01 M/UL (ref 4.2–5.4)
RBC # UR STRIP: ABNORMAL /UL
RBC #/AREA URNS HPF: ABNORMAL /HPF
RBC MORPH BLD: NORMAL
RENAL EPI CELLS #/AREA URNS LPF: ABNORMAL /LPF
SODIUM SERPL-SCNC: 138 MMOL/L (ref 136–145)
SP GR UR STRIP: 1.02
SQUAMOUS #/AREA URNS LPF: ABNORMAL /LPF
UROBILINOGEN UR STRIP-ACNC: 0.2 MG/DL
WBC # BLD AUTO: 15.36 K/UL (ref 4.5–11)
WBC #/AREA URNS HPF: ABNORMAL /HPF
YEAST #/AREA URNS HPF: ABNORMAL /HPF

## 2025-07-10 PROCEDURE — 87077 CULTURE AEROBIC IDENTIFY: CPT | Performed by: INTERNAL MEDICINE

## 2025-07-10 PROCEDURE — 82962 GLUCOSE BLOOD TEST: CPT

## 2025-07-10 PROCEDURE — 85025 COMPLETE CBC W/AUTO DIFF WBC: CPT | Performed by: INTERNAL MEDICINE

## 2025-07-10 PROCEDURE — 82803 BLOOD GASES ANY COMBINATION: CPT

## 2025-07-10 PROCEDURE — 80053 COMPREHEN METABOLIC PANEL: CPT | Performed by: INTERNAL MEDICINE

## 2025-07-10 PROCEDURE — 99285 EMERGENCY DEPT VISIT HI MDM: CPT | Mod: 25

## 2025-07-10 PROCEDURE — 99900035 HC TECH TIME PER 15 MIN (STAT)

## 2025-07-10 PROCEDURE — 96374 THER/PROPH/DIAG INJ IV PUSH: CPT

## 2025-07-10 PROCEDURE — 36600 WITHDRAWAL OF ARTERIAL BLOOD: CPT

## 2025-07-10 PROCEDURE — 63600175 PHARM REV CODE 636 W HCPCS: Performed by: INTERNAL MEDICINE

## 2025-07-10 PROCEDURE — 81003 URINALYSIS AUTO W/O SCOPE: CPT | Performed by: INTERNAL MEDICINE

## 2025-07-10 PROCEDURE — 82009 KETONE BODYS QUAL: CPT | Performed by: INTERNAL MEDICINE

## 2025-07-10 PROCEDURE — 99285 EMERGENCY DEPT VISIT HI MDM: CPT | Mod: ,,, | Performed by: INTERNAL MEDICINE

## 2025-07-10 PROCEDURE — 25000003 PHARM REV CODE 250: Performed by: INTERNAL MEDICINE

## 2025-07-10 RX ORDER — ONDANSETRON HYDROCHLORIDE 2 MG/ML
4 INJECTION, SOLUTION INTRAVENOUS
Status: COMPLETED | OUTPATIENT
Start: 2025-07-10 | End: 2025-07-10

## 2025-07-10 RX ORDER — SODIUM CHLORIDE 9 MG/ML
1000 INJECTION, SOLUTION INTRAVENOUS
Status: COMPLETED | OUTPATIENT
Start: 2025-07-10 | End: 2025-07-10

## 2025-07-10 RX ORDER — ASPIRIN 81 MG/1
1 TABLET ORAL DAILY
COMMUNITY

## 2025-07-10 RX ORDER — NIFEDIPINE 10 MG/1
10 CAPSULE ORAL
COMMUNITY
Start: 2025-07-09 | End: 2026-07-09

## 2025-07-10 RX ADMIN — SODIUM CHLORIDE 500 ML: 0.9 INJECTION, SOLUTION INTRAVENOUS at 04:07

## 2025-07-10 RX ADMIN — ONDANSETRON 4 MG: 2 INJECTION INTRAMUSCULAR; INTRAVENOUS at 03:07

## 2025-07-10 RX ADMIN — SODIUM CHLORIDE 1000 ML: 9 INJECTION, SOLUTION INTRAVENOUS at 05:07

## 2025-07-10 NOTE — ED NOTES
[facility] notified of pt transfer. [facility] eta 25 mins.   
[facility] to facility for transport.   
Report called to [facility] to [facility].   
75

## 2025-07-10 NOTE — ED TRIAGE NOTES
Presented to ER with complaint of N/V starting yesterday evening. Patient is nearly 30 weeks pregnant and reports seeing her OBGYN yesterday and verbalized was prescribed medication to help stop  labor. States she did not pick medication up from pharmacy yet. Denies any ABD pain but reports has been experiencing lower back pain intermittently. Denies pain at this time. Fetal heart tones noted to be 164 at this time. Blood sugar of 116 noted via POCT.

## 2025-07-10 NOTE — ED PROVIDER NOTES
Encounter Date: 7/10/2025       History     Chief Complaint   Patient presents with    Vomiting    Nausea     Patient presents 29 weeks 4 days, now with vomiting still having Damir Davis contractions from the last 2 days.  She was seen by Dr. Reyes OBGYN at [facility] yesterday, was given a prescription for nifedipine which he has not gotten.  Patient has a type 1 diabetic also high risk pregnancy.  No vaginal bleeding of water break.        Review of patient's allergies indicates:  No Known Allergies  Past Medical History:   Diagnosis Date    Diabetes mellitus      History reviewed. No pertinent surgical history.  No family history on file.  Social History[1]  Review of Systems   Constitutional:  Negative for fever.   HENT:  Negative for sore throat.    Respiratory:  Negative for shortness of breath.    Cardiovascular:  Negative for chest pain.   Gastrointestinal:  Negative for nausea.   Genitourinary:  Negative for dysuria.   Musculoskeletal:  Negative for back pain.   Skin:  Negative for rash.   Neurological:  Negative for weakness.   Hematological:  Does not bruise/bleed easily.       Physical Exam     Initial Vitals [07/10/25 1506]   BP Pulse Resp Temp SpO2   115/75 (!) 125 18 98.3 °F (36.8 °C) 97 %      MAP       --         Physical Exam    Vitals reviewed.  Constitutional: She appears well-developed.   Eyes: Pupils are equal, round, and reactive to light.   Neck:   Normal range of motion.  Cardiovascular:  Regular rhythm and normal pulses.   Tachycardia present.         Pulmonary/Chest: Effort normal and breath sounds normal.   Abdominal: Abdomen is soft and protuberant. Bowel sounds are decreased.   Musculoskeletal:         General: Normal range of motion.      Cervical back: Normal range of motion.     Neurological: She is alert. She has normal strength. No cranial nerve deficit. GCS eye subscore is 4. GCS verbal subscore is 5. GCS motor subscore is 6.   Skin: Skin is warm.   Psychiatric: She has  a normal mood and affect.         Medical Screening Exam   See Full Note    ED Course   Procedures  Labs Reviewed   CULTURE, URINE - Abnormal       Result Value    Culture, Urine 11,000 Klethana ascorbata (*)    URINALYSIS, REFLEX TO URINE CULTURE - Abnormal    Color, UA Yellow      Clarity, UA Slightly Cloudy      pH, UA 5.5      Leukocytes, UA Moderate (*)     Nitrites, UA Negative      Protein, UA 30 (*)     Glucose, UA Negative      Ketones, UA >=160 (*)     Urobilinogen, UA 0.2      Bilirubin, UA Small (*)     Blood, UA Trace-Intact (*)     Specific Gravity, UA 1.025     COMPREHENSIVE METABOLIC PANEL - Abnormal    Sodium 138      Potassium 3.9      Chloride 109 (*)     CO2 9 (*)     Anion Gap 24 (*)     Glucose 99      BUN 9      Creatinine 0.97      BUN/Creatinine Ratio 9      Calcium 9.5      Total Protein 7.9      Albumin 2.6 (*)     Globulin 5.3 (*)     A/G Ratio 0.5      Bilirubin, Total 0.4      Alk Phos 147      ALT 8      AST 12      eGFR 84     CBC WITH DIFFERENTIAL - Abnormal    WBC 15.36 (*)     RBC 4.01 (*)     Hemoglobin 11.1 (*)     Hematocrit 33.2 (*)     MCV 82.8      MCH 27.7      MCHC 33.4      RDW 12.5      Platelet Count 277      MPV 11.5      Neutrophils % 78.7 (*)     Lymphocytes % 9.4 (*)     Monocytes % 7.7 (*)     Eosinophils % 0.5 (*)     Basophils % 0.5      Immature Granulocytes % 3.2 (*)     nRBC, Auto 0.0      Neutrophils, Abs 12.08 (*)     Lymphocytes, Absolute 1.45      Monocytes, Absolute 1.18 (*)     Eosinophils, Absolute 0.08      Basophils, Absolute 0.08      Immature Granulocytes, Absolute 0.49 (*)     nRBC, Absolute 0.00      Diff Type Manual     MANUAL DIFFERENTIAL - Abnormal    Segmented Neutrophils, Man % 75 (*)     Bands, Man % 1      Lymphocytes, Man % 17 (*)     Monocytes, Man % 5      Eosinophils, Man % 2      Platelet Morphology Normal      RBC Morphology Normal     URINALYSIS, MICROSCOPIC - Abnormal    WBC, UA 20-50 (*)     RBC, UA 3-5 (*)     Bacteria, UA Few (*)      Yeast, UA Rare (*)     Squamous Epithelial Cells, UA Few (*)     Renal Epithelial Cells, UA Occasional (*)     Mucus, UA Rare (*)     Fine Granular Casts, UA 0-2 (*)     Coarse Granular Casts, UA 0-2 (*)    POCT GLUCOSE MONITORING CONTINUOUS - Abnormal    POC Glucose 116 (*)    ACETONE    Acetone Trace     CBC W/ AUTO DIFFERENTIAL    Narrative:     The following orders were created for panel order CBC auto differential.  Procedure                               Abnormality         Status                     ---------                               -----------         ------                     CBC with Differential[[id number]]       Abnormal            Final result               Manual Differential[[id number]]         Abnormal            Final result                 Please view results for these tests on the individual orders.          Imaging Results    None          Medications   ondansetron injection 4 mg (4 mg Intravenous Given 7/10/25 1544)   sodium chloride 0.9% bolus 500 mL 500 mL (500 mLs Intravenous New Bag 7/10/25 1656)   0.9% NaCl infusion (1,000 mLs Intravenous New Bag 7/10/25 1736)     Medical Decision Making  Patient is 29 weeks type 1 diabetes contractions, possibly preeclampsia or complication of the pregnancy    Amount and/or Complexity of Data Reviewed  Labs: ordered. Decision-making details documented in ED Course.  Discussion of management or test interpretation with external provider(s): PATIENT HAS A HIGH-RISK PREGNANCY, TYPE 1 DIABETES WITH GIANNA IN HIS CONTRACTIONS SEEN BY DR. MUNOZ, OB ON YESTERDAY.  Ordered nifedipine which she has not gotten yet.  Place a call for transfer back to labor and delivery doctors [place] for high-level care.    Risk  Prescription drug management.               ED Course as of 07/13/25 0742   Thu Jul 10, 2025   1519 Fetal heart tones 164 [PW]   1523 POC Glucose(!): 116 [PW]   1552 Urinalysis, Reflex to Urine Culture(!) [PW]   1557 CBC auto differential(!)  [PW]   1605 Acetone, Bld: Trace [PW]   1615 Urinalysis, Microscopic(!) [PW]   1625 CBC auto differential(!) [PW]   1651 The patient is followed by endocrinology at [facility] then requested to be transferred there.  She has been declined from [facility] because [facility] is out of town,  she has been accepted by JEFFREY Castellano at [facility].  CO2 is 9 getting ABG white blood cell count is 15 but no evidence infection [PW]   1652 Patient has ketosis but most likely from volume depletion [PW]   1656 ABG- pH 7.4, pCO2-19, pO2-115, SAT 99%, not DKA. [PW]   Sun Jul 13, 2025   0741 POCT ARTERIAL BLOOD GAS(!!) [PW]   0741 Urine Culture(!): 11,000 Kluyvera ascorbata [PW]      ED Course User Index  [PW] Cecilio Ortega MD                           Clinical Impression:   Final diagnoses:  [O47.9] King Hick's contraction (Primary)  [R11.2] Nausea and vomiting, unspecified vomiting type  [Z3A.29] 29 weeks gestation of pregnancy  [E10.69] Type 1 diabetes mellitus with other specified complication        ED Disposition Condition    Transfer to Another Facility Serious                    [1]   Social History  Tobacco Use    Smoking status: Never    Smokeless tobacco: Never   Substance Use Topics    Alcohol use: Not Currently    Drug use: Never        Cecilio Ortega MD  07/13/25 0763

## 2025-07-11 LAB
HCO3 UR-SCNC: 11.8 MMOL/L (ref 21–28)
PCO2 BLDA: 19 MMHG (ref 35–48)
PH SMN: 7.4 [PH] (ref 7.35–7.45)
PO2 BLDA: 115 MMHG (ref 83–108)
POC BASE EXCESS: -10.7 MMOL/L (ref -2–3)
POC SATURATED O2: 99 %

## 2025-07-13 LAB — UA COMPLETE W REFLEX CULTURE PNL UR: ABNORMAL
